# Patient Record
Sex: MALE | Race: OTHER | HISPANIC OR LATINO
[De-identification: names, ages, dates, MRNs, and addresses within clinical notes are randomized per-mention and may not be internally consistent; named-entity substitution may affect disease eponyms.]

---

## 2024-05-22 PROBLEM — Z00.00 ENCOUNTER FOR PREVENTIVE HEALTH EXAMINATION: Status: ACTIVE | Noted: 2024-05-22

## 2024-05-23 ENCOUNTER — APPOINTMENT (OUTPATIENT)
Dept: INTERNAL MEDICINE | Facility: CLINIC | Age: 44
End: 2024-05-23
Payer: COMMERCIAL

## 2024-05-23 ENCOUNTER — TRANSCRIPTION ENCOUNTER (OUTPATIENT)
Age: 44
End: 2024-05-23

## 2024-05-23 VITALS
SYSTOLIC BLOOD PRESSURE: 157 MMHG | BODY MASS INDEX: 27.3 KG/M2 | WEIGHT: 195 LBS | RESPIRATION RATE: 16 BRPM | DIASTOLIC BLOOD PRESSURE: 89 MMHG | HEART RATE: 99 BPM | OXYGEN SATURATION: 97 % | HEIGHT: 71 IN

## 2024-05-23 DIAGNOSIS — Z86.59 PERSONAL HISTORY OF OTHER MENTAL AND BEHAVIORAL DISORDERS: ICD-10-CM

## 2024-05-23 DIAGNOSIS — N52.9 MALE ERECTILE DYSFUNCTION, UNSPECIFIED: ICD-10-CM

## 2024-05-23 DIAGNOSIS — I10 ESSENTIAL (PRIMARY) HYPERTENSION: ICD-10-CM

## 2024-05-23 PROCEDURE — 99204 OFFICE O/P NEW MOD 45 MIN: CPT | Mod: 25

## 2024-05-23 PROCEDURE — 99408 AUDIT/DAST 15-30 MIN: CPT

## 2024-05-23 PROCEDURE — G0444 DEPRESSION SCREEN ANNUAL: CPT | Mod: 59

## 2024-05-23 RX ORDER — CIPROFLOXACIN HYDROCHLORIDE 750 MG/1
TABLET, FILM COATED ORAL
Refills: 0 | Status: ACTIVE | COMMUNITY

## 2024-05-23 NOTE — HEALTH RISK ASSESSMENT
[Yes] : Yes [2 - 4 times a month (2 pts)] : 2-4 times a month (2 points) [5 or 6 (2 pts)] : 5 or 6 (2  points) [Monthly (2 pts)] : Monthly (2 points) [0] : 2) Feeling down, depressed, or hopeless: Not at all (0) [PHQ-2 Negative - No further assessment needed] : PHQ-2 Negative - No further assessment needed [Time Spent: ___ Minutes] : I spent [unfilled] minutes performing a depression screening for this patient. [Current] : Current [HIV Test offered] : HIV Test offered [Hepatitis C test offered] : Hepatitis C test offered [Audit-CScore] : 6 [de-identified] : Very very occasional, few puff (see HPI) [ColonoscopyComments] : Due at age 45

## 2024-05-23 NOTE — PLAN
[FreeTextEntry1] : #Lower abdominal/pelvic pain (with some LUTS but no dysuria/hematuria) I reviewed bedside renal US:  Right kidney: 11 x 5 x 6.5 cm Cortical Thickness: 1.3 cm Up 1.3 cm LP Left kidney: 10.8 x 4.3 x 5 cm Cortical Thickness: 1.4 cm UP 1.4 cm LP Echogenicity: Normal Bladder: Pre void: 166 ml, Post Void; 14 ml Both urinary jets visualized. Bladder wall thickness: 2 mm Prostate volume; 12 cc (Measured transabdominally) Incidental Finding: Fatty liver Findings: Multiple bilateral echogenic foci visualized. Echogenic focus visualized on the right kidney mid pole with distal shadow and twinkle artifact measures 5 mm Echogenic focus visualized on left kidney; Upper pole 4 mm, mid pole; 5 mm, lower pole : 4 mm Both kidneys are normal in size and echogenicity without hydronephrosis, or solid masses present.  I don't believe his B/L lower abdominal/pelvic pain is secondary to these stones Could this be prostatitis? - CBC, CMP - UA/UCx - Oral hydration  - Continue cipro for now - Will consider urology referral   #HTN - DASH diet extensively discussed, including quitting alcohol - Resume exercise, both cardio and weight lifting once acute sickness resolves - He wants to f/u in 1 month once his acute abdominal/pelvic issue resolves and if BP is still high, will start anti-HTN med. We discussed given he didn't do well with both diuretic and ACEI in the past for one reason or another, I may consider starting CCB and he agreed.   #History of pre-diabetes  #Weight gain - ~30lbs in 5 years #Fatty liver on renal US - concern for MASLD - Check A1C today - RTC for fasting lipid profile - Once his acute sickness resolves, will work extensively on weight loss   #History of acid reflux S/p endoscopy ~4y ago Was started on PPI but doesn't take it anymore as acid reflux improved - Monitor, will likely get better with increased physical activity and weight loss  #Erectile dysfunction As noted above, for the last 1.5 years, he has noticed that his erection is normal the first time around but if he wishes to continue he is unable to have an erection a second time, which is something he didn't experience before This could be a part and parcel of his cardiometabolic issues which I'll be managing as above  - Urology leif

## 2024-05-23 NOTE — COUNSELING
[Behavioral health counseling provided] : Behavioral health counseling provided [Sleep ___ hours/day] : Sleep [unfilled] hours/day [Engage in a relaxing activity] : Engage in a relaxing activity [Plan in advance] : Plan in advance [AUDIT-C Screening administered and reviewed] : AUDIT-C Screening administered and reviewed [Hazards of at-risk alcohol use discussed] : Hazards of at-risk alcohol use discussed [Strategies to reduce or eliminate alcohol use discussed] : Strategies to reduce or eliminate alcohol use discussed [Quit Drinking] : Quit Drinking [Potential consequences of obesity discussed] : Potential consequences of obesity discussed [FreeTextEntry1] : 15 [Benefits of weight loss discussed] : Benefits of weight loss discussed [Encouraged to increase physical activity] : Encouraged to increase physical activity

## 2024-05-23 NOTE — REVIEW OF SYSTEMS
[Fever] : no fever [Chills] : no chills [Fatigue] : fatigue [Vision Problems] : no vision problems [Sore Throat] : no sore throat [Chest Pain] : no chest pain [Palpitations] : no palpitations [Claudication] : no  leg claudication [Lower Ext Edema] : no lower extremity edema [Orthopena] : no orthopnea [Paroxysmal Nocturnal Dyspnea] : no paroxysmal nocturnal dyspnea [Shortness Of Breath] : no shortness of breath [Wheezing] : no wheezing [Cough] : no cough [Dyspnea on Exertion] : not dyspnea on exertion [Abdominal Pain] : abdominal pain [Nausea] : nausea [Constipation] : no constipation [Diarrhea] : diarrhea [Vomiting] : no vomiting [Heartburn] : no heartburn [Melena] : no melena [Dysuria] : no dysuria [Hesitancy] : hesitancy [Hematuria] : no hematuria [Frequency] : frequency [Poor Libido] : libido not poor [Joint Pain] : no joint pain [Back Pain] : back pain [Skin Rash] : no skin rash [Headache] : headache [Dizziness] : no dizziness [Unsteady Walk] : no ataxia [Suicidal] : not suicidal [Insomnia] : no insomnia [Anxiety] : anxiety [Depression] : no depression [Easy Bleeding] : no easy bleeding [Easy Bruising] : no easy bruising

## 2024-05-23 NOTE — HISTORY OF PRESENT ILLNESS
[FreeTextEntry1] : #Lower abdominal pain x1 week #HTN #Weight gain #Anxiety #Health maintenance [de-identified] : #Lower abdominal pain One week ago, developed pins-and-needles/sharp pains in abdomen + mid-and-lower back soreness + HA (pressure-like in eyes) Tue while brushing teeth he spat some blood, which self resolved +Nausea but no vomiting, temp 99.1-100F, +sweats, no dysuria/hematuria Diarrhea one episode last Thu Went to UC on Mon -> COVID/Flu negative, urine studies were negative (according to patient, no reports available) But just in case UC prescribed cipro which he started on Mon Since then he has developed diarrhea again once per day in AM Still feels unwell  #HTN Known for 5-6y Says has had treadmill stress test wnl Received "diuretic" (likely HCTZ) which didn't make him feel well > switched to lisinopril which, per him, gave him cp, dizziness, weakness > went back to cardiologist, changed diet, was told BP had improved and for x2 years has been off of medications; however, he's aware the numbers have started rising again and says his diet has gone back to being suboptimal Diet: Breakfast - nuts and raisins, lunch - rice, beans, meat (takeout), dinner - cooks at home/takeaways - rice, beans, meat (chicken, pork chops, meat), eats fruits 1-2 times a week, vegetables few times a week Drinks alcohol heavily every weekend - both beers and hard liquor, see assessment below  #History of pre-diabetes  As per him, A1C normalized in the past but keeps fluctuating   #Weight gain Used to be ~167 lbs around 5 years ago, now 195 pounds Family hx positive for brother with severe obesity since childhood  Diet as above Used to lift weights but stopped exercising because he thought that was increasing his BP Suboptimal sleep. Sleeps around 11p, wakes up 2-3 times a night, at times to urinate, able to go back sleep, gets up 5:50 am  #History of acid reflux S/p endoscopy ~4y ago Was started on PPI but doesn't take it anymore as acid reflux improved  #Anxiety Exacerbated by recent loss of two cousins at a young age (one  of cancer)  #Sexual Hx: Consistent relationship with female domestic partner Has 2 kids with her: 18yo son and 10yo daughter No contraception except withdrawal For the last 1.5 years, he has noticed that his erection is normal the first time around but if he wishes to continue he is unable to have an erection a second time, which is something he didn't experience before   #Social Hx:  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

## 2024-05-23 NOTE — PHYSICAL EXAM
[Well Developed] : well developed [Well-Appearing] : well-appearing [Normal Voice/Communication] : normal voice/communication [Normal Sclera/Conjunctiva] : normal sclera/conjunctiva [Normal Outer Ear/Nose] : the outer ears and nose were normal in appearance [Supple] : supple [No Respiratory Distress] : no respiratory distress  [No Accessory Muscle Use] : no accessory muscle use [Clear to Auscultation] : lungs were clear to auscultation bilaterally [Normal Rate] : normal rate  [Regular Rhythm] : with a regular rhythm [Normal S1, S2] : normal S1 and S2 [No Murmur] : no murmur heard [No Edema] : there was no peripheral edema [Soft] : abdomen soft [Non-distended] : non-distended [No Masses] : no abdominal mass palpated [No CVA Tenderness] : no CVA  tenderness [No Spinal Tenderness] : no spinal tenderness [No Joint Swelling] : no joint swelling [No Rash] : no rash [Normal Gait] : normal gait [Speech Grossly Normal] : speech grossly normal [Memory Grossly Normal] : memory grossly normal [Normal Affect] : the affect was normal [Normal Mood] : the mood was normal [Normal Insight/Judgement] : insight and judgment were intact [de-identified] : Mild-to-moderate soreness to palpation generalized lower abdomen/pelvic region, no guarding, no rebound tenderness [de-identified] : Anxious

## 2024-05-24 ENCOUNTER — TRANSCRIPTION ENCOUNTER (OUTPATIENT)
Age: 44
End: 2024-05-24

## 2024-05-24 ENCOUNTER — NON-APPOINTMENT (OUTPATIENT)
Age: 44
End: 2024-05-24

## 2024-05-24 ENCOUNTER — LABORATORY RESULT (OUTPATIENT)
Age: 44
End: 2024-05-24

## 2024-05-24 DIAGNOSIS — R77.8 OTHER SPECIFIED ABNORMALITIES OF PLASMA PROTEINS: ICD-10-CM

## 2024-05-24 LAB
ALBUMIN SERPL ELPH-MCNC: 5.4 G/DL
ALP BLD-CCNC: 72 U/L
ALT SERPL-CCNC: 27 U/L
ANION GAP SERPL CALC-SCNC: 14 MMOL/L
AST SERPL-CCNC: 24 U/L
BILIRUB SERPL-MCNC: 0.7 MG/DL
BUN SERPL-MCNC: 13 MG/DL
CALCIUM SERPL-MCNC: 10.2 MG/DL
CHLORIDE SERPL-SCNC: 97 MMOL/L
CO2 SERPL-SCNC: 23 MMOL/L
CREAT SERPL-MCNC: 1.2 MG/DL
EGFR: 76 ML/MIN/1.73M2
ESTIMATED AVERAGE GLUCOSE: 114 MG/DL
GLUCOSE SERPL-MCNC: 97 MG/DL
HBA1C MFR BLD HPLC: 5.6 %
HCT VFR BLD CALC: 48.1 %
HCV AB SER QL: NONREACTIVE
HCV S/CO RATIO: 0.12 S/CO
HGB BLD-MCNC: 15.9 G/DL
MCHC RBC-ENTMCNC: 29 PG
MCHC RBC-ENTMCNC: 33.1 GM/DL
MCV RBC AUTO: 87.8 FL
PLATELET # BLD AUTO: 279 K/UL
POTASSIUM SERPL-SCNC: 4.6 MMOL/L
PROT SERPL-MCNC: 8.9 G/DL
RBC # BLD: 5.48 M/UL
RBC # FLD: 13 %
SODIUM SERPL-SCNC: 135 MMOL/L
TESTOST FREE SERPL-MCNC: 9.9 PG/ML
TESTOST SERPL-MCNC: 262 NG/DL
WBC # FLD AUTO: 6.64 K/UL

## 2024-05-25 LAB
HBV SURFACE AB SER QL: NONREACTIVE
HBV SURFACE AG SER QL: NONREACTIVE
HIV1+2 AB SPEC QL IA.RAPID: NONREACTIVE

## 2024-05-28 LAB
ALBUPE: 21.6 %
ALPHA1UPE: 35.8 %
ALPHA2UPE: 20.8 %
BETAUPE: 11.9 %
GAMMAUPE: 9.9 %
IGA 24H UR QL IFE: NORMAL
KAPPA LC 24H UR QL: NORMAL
PROT PATTERN 24H UR ELPH-IMP: NORMAL
PROT UR-MCNC: 7 MG/DL
PROT UR-MCNC: 7 MG/DL

## 2024-05-30 ENCOUNTER — TRANSCRIPTION ENCOUNTER (OUTPATIENT)
Age: 44
End: 2024-05-30

## 2024-05-30 ENCOUNTER — NON-APPOINTMENT (OUTPATIENT)
Age: 44
End: 2024-05-30

## 2024-05-31 LAB
ALBUMIN MFR SERPL ELPH: 59.2 %
ALBUMIN SERPL-MCNC: 5.1 G/DL
ALBUMIN/GLOB SERPL: 1.5 RATIO
ALPHA1 GLOB MFR SERPL ELPH: 2.9 %
ALPHA1 GLOB SERPL ELPH-MCNC: 0.2 G/DL
ALPHA2 GLOB MFR SERPL ELPH: 6.6 %
ALPHA2 GLOB SERPL ELPH-MCNC: 0.6 G/DL
B-GLOBULIN MFR SERPL ELPH: 12.6 %
B-GLOBULIN SERPL ELPH-MCNC: 1.1 G/DL
GAMMA GLOB FLD ELPH-MCNC: 1.6 G/DL
GAMMA GLOB MFR SERPL ELPH: 18.7 %
INTERPRETATION SERPL IEP-IMP: NORMAL
PROT SERPL-MCNC: 8.6 G/DL
PROT SERPL-MCNC: 8.6 G/DL

## 2024-06-04 ENCOUNTER — APPOINTMENT (OUTPATIENT)
Dept: UROLOGY | Facility: CLINIC | Age: 44
End: 2024-06-04

## 2024-06-04 VITALS
OXYGEN SATURATION: 97 % | WEIGHT: 191 LBS | BODY MASS INDEX: 26.74 KG/M2 | HEART RATE: 66 BPM | HEIGHT: 71 IN | DIASTOLIC BLOOD PRESSURE: 75 MMHG | RESPIRATION RATE: 16 BRPM | SYSTOLIC BLOOD PRESSURE: 127 MMHG

## 2024-06-05 ENCOUNTER — APPOINTMENT (OUTPATIENT)
Dept: INTERNAL MEDICINE | Facility: CLINIC | Age: 44
End: 2024-06-05
Payer: COMMERCIAL

## 2024-06-05 ENCOUNTER — APPOINTMENT (OUTPATIENT)
Dept: UROLOGY | Facility: CLINIC | Age: 44
End: 2024-06-05
Payer: COMMERCIAL

## 2024-06-05 VITALS — SYSTOLIC BLOOD PRESSURE: 153 MMHG | HEART RATE: 87 BPM | DIASTOLIC BLOOD PRESSURE: 85 MMHG | TEMPERATURE: 97.9 F

## 2024-06-05 DIAGNOSIS — Z23 ENCOUNTER FOR IMMUNIZATION: ICD-10-CM

## 2024-06-05 DIAGNOSIS — R10.2 PELVIC AND PERINEAL PAIN: ICD-10-CM

## 2024-06-05 DIAGNOSIS — R39.9 UNSPECIFIED SYMPTOMS AND SIGNS INVOLVING THE GENITOURINARY SYSTEM: ICD-10-CM

## 2024-06-05 DIAGNOSIS — R10.9 UNSPECIFIED ABDOMINAL PAIN: ICD-10-CM

## 2024-06-05 DIAGNOSIS — M54.9 DORSALGIA, UNSPECIFIED: ICD-10-CM

## 2024-06-05 DIAGNOSIS — N20.0 CALCULUS OF KIDNEY: ICD-10-CM

## 2024-06-05 PROCEDURE — G2211 COMPLEX E/M VISIT ADD ON: CPT | Mod: NC

## 2024-06-05 PROCEDURE — 76870 US EXAM SCROTUM: CPT

## 2024-06-05 PROCEDURE — 99205 OFFICE O/P NEW HI 60 MIN: CPT | Mod: 25

## 2024-06-05 PROCEDURE — 76775 US EXAM ABDO BACK WALL LIM: CPT

## 2024-06-05 PROCEDURE — 99213 OFFICE O/P EST LOW 20 MIN: CPT

## 2024-06-05 PROCEDURE — 51798 US URINE CAPACITY MEASURE: CPT

## 2024-06-05 RX ORDER — TAMSULOSIN HYDROCHLORIDE 0.4 MG/1
0.4 CAPSULE ORAL
Qty: 90 | Refills: 1 | Status: ACTIVE | COMMUNITY
Start: 2024-06-05 | End: 1900-01-01

## 2024-06-05 NOTE — ASSESSMENT
[FreeTextEntry1] : His abdominal pain appears GI in origin at this point I will proceed with CT Given elevated serum total protein, I would rule out malignancy Based on CT report, I will consider GI referral  Give hep B sAb negative, I will offer vaccination that he can get once above issue is addressed.

## 2024-06-05 NOTE — ASSESSMENT
[FreeTextEntry1] : 44 year old male with abdominal pain, mild frequency, mild perineal pain. Went to urgent care where urine culture negative but treated with course of cipro without improvement in symptoms. Symptoms largely abdominal at this time. Mild perineal pain with increased frequency but also increased fluids. Renal US as part of work-up showed non-obstructing renal stones - repeat today to ensure no development of hydro, confirmed no hydro. Testicular US no acute concerns. ARIN no significant tenderness, bogginess, fluctuance. Discussed with Dr. Serrano - will send patient for CT to better evaluate - already planned for CT stone hunt to evaluate stones.  Plan: - UA, culture (post -ARIN) - will hold on abx unless UA/culture positive - gonorrhea/chlamydia though no risk factors - non-con CT to evaluate stones (non-obstructing - do not suspect contributing to current presentation) - tamsulosin and NSAIDs in case element of non-bacterial prostatitis contributing

## 2024-06-05 NOTE — PHYSICAL EXAM
[Abdomen Soft] : soft [Costovertebral Angle Tenderness] : no ~M costovertebral angle tenderness [Testes Tenderness] : no tenderness of the testes [Testes Mass (___cm)] : there were no testicular masses [Prostate Tenderness] : the prostate was not tender [No Prostate Nodules] : no prostate nodules

## 2024-06-05 NOTE — PHYSICAL EXAM
[No Acute Distress] : no acute distress [Well Nourished] : well nourished [Well Developed] : well developed [Well-Appearing] : well-appearing [Normal Voice/Communication] : normal voice/communication [Soft] : abdomen soft [Non-distended] : non-distended [No Masses] : no abdominal mass palpated [No HSM] : no HSM [No CVA Tenderness] : no CVA  tenderness [No Spinal Tenderness] : no spinal tenderness [No Rash] : no rash [de-identified] : Mild pressure-like sensation upon deep palpation of epigastrium, periumbilical region, as well as bilaterla lower abdominal areas

## 2024-06-05 NOTE — HISTORY OF PRESENT ILLNESS
[FreeTextEntry1] : Name DONTRELL ROMO  MRN [id number]   1980  Contact Number: [phone] ----------------------------------------------------------------------------------------------------------------------------------------- Date of First Visit: 24 Referring Provider/PCP: Dr. Serrano -----------------------------------------------------------------------------------------------------------------------------------------  CC: perineal pain, kidney stones, urinary frequency  History of Present Illness: DONTRELL ROMO is a 44 year old male who reports 3 weeks ago  developed abdominal pain, back pain bilaterally with low grade fever to 99. Has some increased urinary frequency, but was also drinking more fluid. No dysuria. No urgency. No major urinary issues at that time. Went to urgent care - UA 24: nitrite neg, LE +, Urine culture negative, but in case prescribed course of cipro (7 days), completed 24. Came to see Dr. Serrano on , before completed course as symptoms were worse - back and stomach were bloated. Developed perineal pain radiating towards testicles, increased urinary frequency, occasional urgency. No dysuria. No more fevers.   No urine studies but had renal US in our office at PCP visit: Bladder: Pre void: 166 ml, Post Void; 14 ml Both urinary jets visualized. Bladder wall thickness; 2 mm Prostate volume; 12 cc ( Measured transabdominally) Echogenic focus visualized on the right kidney mid pole with distal shadow and twinkle artifact measures 5 mm Echogenic focus visualized on left kidney; Upper pole 4 mm, mid pole; 5 mm, lower pole : 4 mm Both kidneys are normal in size and echogenicity without hydronephrosis, or solid masses present.  Patient reports symptoms currently less intense. Still with abdominal and back pain. Urinary symptoms on and off, not baseline. At baseline no issues with urination prior to this. + constipation, passing gas but less than usual. Mild nausea, no emesis. Felt like abx made GI symptoms worse.  With regards to erections - over last 1.5 years notices if wants to have intercourse immediately after just completed then unable to have. Prior to this not an issue. No issues obtaining or maintaining erections for initial episode intercourse.  US today:  Testicular: Bilateral varicocele present Left 3.4 mm, Right 2.6 mm  Bilateral microlithiasis visualized  Small 1.8 mm simple cyst visualized on the right epididymal head. Renal Ultrasound (given worsening back pain - r/o any migration of stone/obstruction): Unremarkable exam, No hydronephrosis.  IPSS15 QOL 5 JASE 22 PVR (to ensure adequate emptying): 3  PMH: HTN, preDM PSH: finger decapitation Family History: no  malignancies, grandmother stomach cancer  Social: rare smoker, social alcohol, no recreational drugs, works as  at a college, domestic partner and has 2 children  Meds: none Allergies: NKDA ROS: no fevers, chills ----------------------------------------------------------------------------------------------------------------------------------------- Labs: 24: T 262 (not early AM) Cr 1.2, eGFR 76 A1c 5.6 WBC 6.64

## 2024-06-05 NOTE — REVIEW OF SYSTEMS
[Abdominal Pain] : abdominal pain [Constipation] : constipation [Diarrhea] : no diarrhea [Heartburn] : no heartburn [Melena] : no melena [Back Pain] : back pain [Headache] : headache

## 2024-06-05 NOTE — HISTORY OF PRESENT ILLNESS
[FreeTextEntry1] : Lower abdominal pain associated with lower back pain (both described as a pressure-like sensation) x2-3 weeks now For the past 3 days, he's also struggling to have a bowel movement  [de-identified] : He's still having the lower abdominal and back pain/discomfort/pressure-like sensation  No improvement after taking cipro as originally prescribed by UC Seen extensively by Dr. Abreu and no concern for pyelonephritis or prostatis or any urological etiology

## 2024-06-06 LAB
C TRACH RRNA SPEC QL NAA+PROBE: NOT DETECTED
CHOLEST SERPL-MCNC: 173 MG/DL
HDLC SERPL-MCNC: 37 MG/DL
LDLC SERPL CALC-MCNC: 112 MG/DL
N GONORRHOEA RRNA SPEC QL NAA+PROBE: NOT DETECTED
NONHDLC SERPL-MCNC: 136 MG/DL
SOURCE AMPLIFICATION: NORMAL
TRIGL SERPL-MCNC: 135 MG/DL

## 2024-06-07 LAB
APPEARANCE: CLEAR
BACTERIA: NEGATIVE /HPF
BILIRUBIN URINE: NEGATIVE
BLOOD URINE: NEGATIVE
CAST: 0 /LPF
COLOR: YELLOW
EPITHELIAL CELLS: 0 /HPF
GLUCOSE QUALITATIVE U: NEGATIVE MG/DL
KETONES URINE: NEGATIVE MG/DL
LEUKOCYTE ESTERASE URINE: NEGATIVE
MICROSCOPIC-UA: NORMAL
NITRITE URINE: NEGATIVE
PH URINE: 6.5
PROTEIN URINE: NEGATIVE MG/DL
RED BLOOD CELLS URINE: 0 /HPF
SPECIFIC GRAVITY URINE: 1
UROBILINOGEN URINE: 0.2 MG/DL
WHITE BLOOD CELLS URINE: 0 /HPF

## 2024-06-08 ENCOUNTER — TRANSCRIPTION ENCOUNTER (OUTPATIENT)
Age: 44
End: 2024-06-08

## 2024-06-09 ENCOUNTER — NON-APPOINTMENT (OUTPATIENT)
Age: 44
End: 2024-06-09

## 2024-06-10 LAB — BACTERIA UR CULT: NORMAL

## 2024-06-13 ENCOUNTER — NON-APPOINTMENT (OUTPATIENT)
Age: 44
End: 2024-06-13

## 2024-06-18 ENCOUNTER — TRANSCRIPTION ENCOUNTER (OUTPATIENT)
Age: 44
End: 2024-06-18

## 2024-06-28 ENCOUNTER — TRANSCRIPTION ENCOUNTER (OUTPATIENT)
Age: 44
End: 2024-06-28

## 2024-07-02 ENCOUNTER — TRANSCRIPTION ENCOUNTER (OUTPATIENT)
Age: 44
End: 2024-07-02

## 2024-07-02 PROBLEM — Z23 REQUIRES HEPATITIS B VACCINATION: Status: ACTIVE | Noted: 2024-07-02

## 2024-07-18 ENCOUNTER — APPOINTMENT (OUTPATIENT)
Dept: ORTHOPEDIC SURGERY | Facility: CLINIC | Age: 44
End: 2024-07-18
Payer: COMMERCIAL

## 2024-07-18 VITALS
SYSTOLIC BLOOD PRESSURE: 150 MMHG | BODY MASS INDEX: 26.74 KG/M2 | HEIGHT: 71 IN | HEART RATE: 88 BPM | WEIGHT: 191 LBS | OXYGEN SATURATION: 99 % | TEMPERATURE: 98 F | DIASTOLIC BLOOD PRESSURE: 81 MMHG

## 2024-07-18 DIAGNOSIS — M54.50 LOW BACK PAIN, UNSPECIFIED: ICD-10-CM

## 2024-07-18 DIAGNOSIS — M54.9 DORSALGIA, UNSPECIFIED: ICD-10-CM

## 2024-07-18 PROCEDURE — 99205 OFFICE O/P NEW HI 60 MIN: CPT

## 2024-08-22 ENCOUNTER — APPOINTMENT (OUTPATIENT)
Dept: INTERNAL MEDICINE | Facility: CLINIC | Age: 44
End: 2024-08-22
Payer: COMMERCIAL

## 2024-08-22 VITALS
DIASTOLIC BLOOD PRESSURE: 97 MMHG | RESPIRATION RATE: 16 BRPM | OXYGEN SATURATION: 97 % | SYSTOLIC BLOOD PRESSURE: 144 MMHG | HEART RATE: 78 BPM | TEMPERATURE: 98.1 F

## 2024-08-22 DIAGNOSIS — R20.9 UNSPECIFIED DISTURBANCES OF SKIN SENSATION: ICD-10-CM

## 2024-08-22 DIAGNOSIS — Z11.3 ENCOUNTER FOR SCREENING FOR INFECTIONS WITH A PREDOMINANTLY SEXUAL MODE OF TRANSMISSION: ICD-10-CM

## 2024-08-22 DIAGNOSIS — R20.2 PARESTHESIA OF SKIN: ICD-10-CM

## 2024-08-22 DIAGNOSIS — R77.9 ABNORMALITY OF PLASMA PROTEIN, UNSPECIFIED: ICD-10-CM

## 2024-08-22 PROCEDURE — 99214 OFFICE O/P EST MOD 30 MIN: CPT

## 2024-08-22 PROCEDURE — G2211 COMPLEX E/M VISIT ADD ON: CPT | Mod: NC

## 2024-08-22 RX ORDER — DICLOFENAC SODIUM 10 MG/G
1 GEL TOPICAL DAILY
Qty: 1 | Refills: 1 | Status: ACTIVE | COMMUNITY
Start: 2024-08-22 | End: 1900-01-01

## 2024-08-22 RX ORDER — LOSARTAN POTASSIUM 25 MG/1
25 TABLET, FILM COATED ORAL DAILY
Qty: 1 | Refills: 0 | Status: ACTIVE | COMMUNITY
Start: 2024-08-22 | End: 1900-01-01

## 2024-08-23 LAB
ALBUMIN SERPL ELPH-MCNC: 5.2 G/DL
ALP BLD-CCNC: 77 U/L
ALT SERPL-CCNC: 18 U/L
ANION GAP SERPL CALC-SCNC: 14 MMOL/L
AST SERPL-CCNC: 21 U/L
BILIRUB SERPL-MCNC: 0.7 MG/DL
BUN SERPL-MCNC: 15 MG/DL
CALCIUM SERPL-MCNC: 9.7 MG/DL
CHLORIDE SERPL-SCNC: 99 MMOL/L
CO2 SERPL-SCNC: 24 MMOL/L
CREAT SERPL-MCNC: 1.14 MG/DL
CREAT SPEC-SCNC: 102 MG/DL
CREAT/PROT UR: 0.1 RATIO
CRP SERPL-MCNC: <3 MG/L
EGFR: 81 ML/MIN/1.73M2
ERYTHROCYTE [SEDIMENTATION RATE] IN BLOOD BY WESTERGREN METHOD: 20 MM/HR
FOLATE SERPL-MCNC: 7.1 NG/ML
GLUCOSE SERPL-MCNC: 94 MG/DL
POTASSIUM SERPL-SCNC: 4.6 MMOL/L
PROT SERPL-MCNC: 8.4 G/DL
PROT UR-MCNC: 7 MG/DL
SODIUM SERPL-SCNC: 137 MMOL/L
T PALLIDUM AB SER QL IA: NEGATIVE
TSH SERPL-ACNC: 2.25 UIU/ML
VIT B12 SERPL-MCNC: 217 PG/ML

## 2024-08-23 NOTE — HEALTH RISK ASSESSMENT
[Yes] : Yes [Monthly or less (1 pt)] : Monthly or less (1 point) [5 or 6 (2 pts)] : 5 or 6 (2  points) [Less than monthly (1 pt)] : Less than monthly (1 point) [Audit-CScore] : 4

## 2024-08-23 NOTE — PHYSICAL EXAM
[No Acute Distress] : no acute distress [Well Nourished] : well nourished [Well Developed] : well developed [Well-Appearing] : well-appearing [Normal Voice/Communication] : normal voice/communication [Normal Sclera/Conjunctiva] : normal sclera/conjunctiva [EOMI] : extraocular movements intact [Normal Outer Ear/Nose] : the outer ears and nose were normal in appearance [Supple] : supple [No Respiratory Distress] : no respiratory distress  [No Edema] : there was no peripheral edema [Soft] : abdomen soft [Non-distended] : non-distended [No CVA Tenderness] : no CVA  tenderness [No Spinal Tenderness] : no spinal tenderness [Kyphosis] : no kyphosis [Scoliosis] : no scoliosis [No Joint Swelling] : no joint swelling [No Rash] : no rash [Normal Gait] : normal gait [Normal Affect] : the affect was normal [Alert and Oriented x3] : oriented to person, place, and time [Normal Insight/Judgement] : insight and judgment were intact

## 2024-08-23 NOTE — REVIEW OF SYSTEMS
[Fatigue] : fatigue [Vision Problems] : no vision problems [Sore Throat] : no sore throat [Chest Pain] : no chest pain [Shortness Of Breath] : no shortness of breath [Abdominal Pain] : no abdominal pain [Back Pain] : back pain [Anxiety] : anxiety [de-identified] : Cold hands and feet with pins and needles sensation

## 2024-08-23 NOTE — REVIEW OF SYSTEMS
[Fatigue] : fatigue [Vision Problems] : no vision problems [Sore Throat] : no sore throat [Chest Pain] : no chest pain [Shortness Of Breath] : no shortness of breath [Abdominal Pain] : no abdominal pain [Back Pain] : back pain [Anxiety] : anxiety [de-identified] : Cold hands and feet with pins and needles sensation

## 2024-08-23 NOTE — ASSESSMENT
[FreeTextEntry1] : #HTN - uncontrolled, on no medications - Check UPC - Discussed starting losartan. He's concerned about side effects and only wants to trial lowest dose. - DASH diet discussed once again  - F/u in 1 month   #HLD  / A1C wnl - Discussed low sat and high fiber diet   #Back pain He works as a  and describes several years of tightness and pain throughout the paraspinal region of the thoracic lumbar spine diffusely. CT with L4-S1 disc protrusion identing/abutting the thecal sac but no central canal or foraminal stenosis.  Saw ortho Dr. Kingsley who thinks it's chronic myofascial thoracic and lumbar spine pain and recommended PT for thoracic and lumbar stability mechanics as well as local soft tissue work on the area of pain However, patient did not pursue PT as he thinks his issue is more spine-related than muscle and is afraid PT may worsen his pain  - For pain control, trial topical diclofenac - PT reinforced   #Elevated albumin and total protein  Albumin 5.4 / Total protein 8.9 UPEP wnl SPEP otherwise normal except mild beta elevation at 1.1g/dL No monoclonal band identified  - I am unclear about the etiology of elevated protein here. Will consult hematology.   #Elevated total testosterone However, free wnl - Perhaps related to elevated protein?   #Cold hands and feet #Pins and needles sensation  - Labs as ordered  #HCM - Colonoscopy at age 45 - Hep B sAb non-reactive: Discussed need for hep B vaccination series which he will return for as he is leaving for a vacation and does not want to experience any side effects there - STI screening: HIV and chlamydia/GC negative. Check syphilis.

## 2024-08-23 NOTE — HISTORY OF PRESENT ILLNESS
[FreeTextEntry1] : Follow up on: HTN HLD Back pain Elevated albumin and total protein  Hep B sAb non-reactive  Elevated total testosterone (free wnl) [de-identified] : #HTN - uncontrolled, on no medications 144/97 here but says it was 122/88 at home Has a tendency to feel more stress/anxiety than most people Says diet has been an issue for me and does not understand why he has been unable to eat healthier despite knowing that he has to  #HLD  / A1C wnl  #Back pain He works as a  and describes several years of tightness and pain throughout the paraspinal region of the thoracic lumbar spine diffusely. CT with L4-S1 disc protrusion identing/abutting the thecal sac but no central canal or foraminal stenosis.  Saw ortho Dr. Kingsley who thinks it's chronic myofascial thoracic and lumbar spine pain and recommended PT for thoracic and lumbar stability mechanics as well as local soft tissue work on the area of pain However, patient did not pursue PT as he thinks his issue is more spine-related than muscle and is afraid PT may worsen his pain   #Elevated albumin and total protein  Albumin 5.4 / Total protein 8.9 UPEP wnl SPEP otherwise normal except mild beta elevation at 1.1g/dL No monoclonal band identified   #Hep B sAb non-reactive  Discussed need for hep B vaccination series which he will return for as he is leaving for a vacation and does not want to experience any side effects there  #Elevated total testosterone However, free wnl Perhaps related to elevated protein?   Today he is also describing sensations such as cold hands and feet with pins and needles sensation

## 2024-08-23 NOTE — HISTORY OF PRESENT ILLNESS
[FreeTextEntry1] : Follow up on: HTN HLD Back pain Elevated albumin and total protein  Hep B sAb non-reactive  Elevated total testosterone (free wnl) [de-identified] : #HTN - uncontrolled, on no medications 144/97 here but says it was 122/88 at home Has a tendency to feel more stress/anxiety than most people Says diet has been an issue for me and does not understand why he has been unable to eat healthier despite knowing that he has to  #HLD  / A1C wnl  #Back pain He works as a  and describes several years of tightness and pain throughout the paraspinal region of the thoracic lumbar spine diffusely. CT with L4-S1 disc protrusion identing/abutting the thecal sac but no central canal or foraminal stenosis.  Saw ortho Dr. Kingsley who thinks it's chronic myofascial thoracic and lumbar spine pain and recommended PT for thoracic and lumbar stability mechanics as well as local soft tissue work on the area of pain However, patient did not pursue PT as he thinks his issue is more spine-related than muscle and is afraid PT may worsen his pain   #Elevated albumin and total protein  Albumin 5.4 / Total protein 8.9 UPEP wnl SPEP otherwise normal except mild beta elevation at 1.1g/dL No monoclonal band identified   #Hep B sAb non-reactive  Discussed need for hep B vaccination series which he will return for as he is leaving for a vacation and does not want to experience any side effects there  #Elevated total testosterone However, free wnl Perhaps related to elevated protein?   Today he is also describing sensations such as cold hands and feet with pins and needles sensation

## 2024-09-01 PROBLEM — E53.8 B12 DEFICIENCY: Status: ACTIVE | Noted: 2024-09-01

## 2024-09-24 ENCOUNTER — APPOINTMENT (OUTPATIENT)
Dept: INTERNAL MEDICINE | Facility: CLINIC | Age: 44
End: 2024-09-24

## 2025-03-28 ENCOUNTER — APPOINTMENT (OUTPATIENT)
Dept: INTERNAL MEDICINE | Facility: CLINIC | Age: 45
End: 2025-03-28
Payer: COMMERCIAL

## 2025-03-28 ENCOUNTER — LABORATORY RESULT (OUTPATIENT)
Age: 45
End: 2025-03-28

## 2025-03-28 VITALS
TEMPERATURE: 98.2 F | OXYGEN SATURATION: 100 % | RESPIRATION RATE: 17 BRPM | WEIGHT: 194 LBS | SYSTOLIC BLOOD PRESSURE: 159 MMHG | BODY MASS INDEX: 27.06 KG/M2 | DIASTOLIC BLOOD PRESSURE: 98 MMHG | HEART RATE: 106 BPM

## 2025-03-28 DIAGNOSIS — Z00.00 ENCOUNTER FOR GENERAL ADULT MEDICAL EXAMINATION W/OUT ABNORMAL FINDINGS: ICD-10-CM

## 2025-03-28 DIAGNOSIS — M25.512 PAIN IN LEFT SHOULDER: ICD-10-CM

## 2025-03-28 DIAGNOSIS — R70.0 ELEVATED ERYTHROCYTE SEDIMENTATION RATE: ICD-10-CM

## 2025-03-28 DIAGNOSIS — Z13.1 ENCOUNTER FOR SCREENING FOR DIABETES MELLITUS: ICD-10-CM

## 2025-03-28 DIAGNOSIS — E78.2 MIXED HYPERLIPIDEMIA: ICD-10-CM

## 2025-03-28 DIAGNOSIS — M54.9 DORSALGIA, UNSPECIFIED: ICD-10-CM

## 2025-03-28 DIAGNOSIS — G89.29 PAIN IN LEFT SHOULDER: ICD-10-CM

## 2025-03-28 DIAGNOSIS — Z23 ENCOUNTER FOR IMMUNIZATION: ICD-10-CM

## 2025-03-28 DIAGNOSIS — Z13.220 ENCOUNTER FOR SCREENING FOR LIPOID DISORDERS: ICD-10-CM

## 2025-03-28 DIAGNOSIS — E53.8 DEFICIENCY OF OTHER SPECIFIED B GROUP VITAMINS: ICD-10-CM

## 2025-03-28 PROCEDURE — 99213 OFFICE O/P EST LOW 20 MIN: CPT | Mod: 25

## 2025-03-31 PROBLEM — M54.9 BACK PAIN, ACUTE: Status: RESOLVED | Noted: 2024-06-05 | Resolved: 2025-03-31

## 2025-03-31 PROBLEM — R70.0 ELEVATED ERYTHROCYTE SEDIMENTATION RATE: Status: ACTIVE | Noted: 2025-03-31

## 2025-03-31 PROBLEM — E53.8 B12 DEFICIENCY: Status: RESOLVED | Noted: 2024-09-01 | Resolved: 2025-03-31

## 2025-03-31 PROBLEM — Z13.220 SCREENING FOR LIPID DISORDERS: Status: RESOLVED | Noted: 2025-03-28 | Resolved: 2025-03-31

## 2025-03-31 PROBLEM — E78.2 MIXED HYPERLIPIDEMIA: Status: ACTIVE | Noted: 2025-03-31

## 2025-03-31 LAB
ALBUMIN SERPL ELPH-MCNC: 5.1 G/DL
ALP BLD-CCNC: 70 U/L
ALT SERPL-CCNC: 25 U/L
ANION GAP SERPL CALC-SCNC: 15 MMOL/L
AST SERPL-CCNC: 26 U/L
BASOPHILS # BLD AUTO: 0.01 K/UL
BASOPHILS NFR BLD AUTO: 0.2 %
BILIRUB SERPL-MCNC: 0.6 MG/DL
BUN SERPL-MCNC: 17 MG/DL
CALCIUM SERPL-MCNC: 9.9 MG/DL
CHLORIDE SERPL-SCNC: 101 MMOL/L
CHOLEST SERPL-MCNC: 194 MG/DL
CO2 SERPL-SCNC: 23 MMOL/L
CREAT SERPL-MCNC: 1.13 MG/DL
EGFRCR SERPLBLD CKD-EPI 2021: 82 ML/MIN/1.73M2
EOSINOPHIL # BLD AUTO: 0.03 K/UL
EOSINOPHIL NFR BLD AUTO: 0.5 %
ERYTHROCYTE [SEDIMENTATION RATE] IN BLOOD BY WESTERGREN METHOD: 36 MM/HR
ESTIMATED AVERAGE GLUCOSE: 111 MG/DL
GLUCOSE SERPL-MCNC: 107 MG/DL
HBA1C MFR BLD HPLC: 5.5 %
HCT VFR BLD CALC: 49 %
HDLC SERPL-MCNC: 38 MG/DL
HGB BLD-MCNC: 15.9 G/DL
IMM GRANULOCYTES NFR BLD AUTO: 0.2 %
LDLC SERPL-MCNC: 106 MG/DL
LYMPHOCYTES # BLD AUTO: 1.99 K/UL
LYMPHOCYTES NFR BLD AUTO: 31.8 %
MAN DIFF?: NORMAL
MCHC RBC-ENTMCNC: 29 PG
MCHC RBC-ENTMCNC: 32.4 G/DL
MCV RBC AUTO: 89.3 FL
MONOCYTES # BLD AUTO: 0.45 K/UL
MONOCYTES NFR BLD AUTO: 7.2 %
NEUTROPHILS # BLD AUTO: 3.77 K/UL
NEUTROPHILS NFR BLD AUTO: 60.1 %
NONHDLC SERPL-MCNC: 156 MG/DL
PLATELET # BLD AUTO: 255 K/UL
POTASSIUM SERPL-SCNC: 4.4 MMOL/L
PROT SERPL-MCNC: 8.2 G/DL
RBC # BLD: 5.49 M/UL
RBC # FLD: 13 %
SODIUM SERPL-SCNC: 138 MMOL/L
TRIGL SERPL-MCNC: 291 MG/DL
VIT B12 SERPL-MCNC: 399 PG/ML
WBC # FLD AUTO: 6.26 K/UL

## 2025-04-02 LAB — APO LP(A) SERPL-MCNC: 14.6 NMOL/L

## 2025-04-03 LAB
ALBUMIN MFR SERPL ELPH: 58.8 %
ALBUMIN SERPL-MCNC: 4.8 G/DL
ALBUMIN/GLOB SERPL: 1.4 RATIO
ALPHA1 GLOB MFR SERPL ELPH: 3.3 %
ALPHA1 GLOB SERPL ELPH-MCNC: 0.3 G/DL
ALPHA2 GLOB MFR SERPL ELPH: 7.6 %
ALPHA2 GLOB SERPL ELPH-MCNC: 0.6 G/DL
B-GLOBULIN MFR SERPL ELPH: 12.6 %
B-GLOBULIN SERPL ELPH-MCNC: 1 G/DL
GAMMA GLOB FLD ELPH-MCNC: 1.5 G/DL
GAMMA GLOB MFR SERPL ELPH: 17.7 %
INTERPRETATION SERPL IEP-IMP: NORMAL
M PROTEIN MFR SERPL ELPH: NORMAL
MONOCLON BAND OBS SERPL: NORMAL
PROT SERPL-MCNC: 8.2 G/DL
PROT SERPL-MCNC: 8.2 G/DL